# Patient Record
Sex: FEMALE | Race: BLACK OR AFRICAN AMERICAN | NOT HISPANIC OR LATINO | Employment: FULL TIME | URBAN - METROPOLITAN AREA
[De-identification: names, ages, dates, MRNs, and addresses within clinical notes are randomized per-mention and may not be internally consistent; named-entity substitution may affect disease eponyms.]

---

## 2021-12-09 DIAGNOSIS — O30.042 DICHORIONIC DIAMNIOTIC TWIN PREGNANCY IN SECOND TRIMESTER: Primary | ICD-10-CM

## 2021-12-09 DIAGNOSIS — Z36.89 ENCOUNTER FOR FETAL ANATOMIC SURVEY: ICD-10-CM

## 2022-01-04 ENCOUNTER — TELEPHONE (OUTPATIENT)
Dept: MATERNAL FETAL MEDICINE | Facility: CLINIC | Age: 27
End: 2022-01-04

## 2022-01-04 ENCOUNTER — PATIENT MESSAGE (OUTPATIENT)
Dept: MATERNAL FETAL MEDICINE | Facility: CLINIC | Age: 27
End: 2022-01-04

## 2022-01-04 NOTE — TELEPHONE ENCOUNTER
Pt verified self by name, . JOSE LUISCARLYLE.  Pt states she cancel her appt for tomorrow as she is currently experiencing complications at this time. She is currently in [place] for a week now. PT is waiting to hear back from Dr. Fish but pt wants to keep the current high risk doctor she is seeing now in [place]. Pt confirmed to cancel and not reschedule any appts with MFM at this time.   Pt verbalized understanding. Agreed to POC w intent to comply.

## 2024-02-19 ENCOUNTER — TELEPHONE (OUTPATIENT)
Dept: MATERNAL FETAL MEDICINE | Facility: CLINIC | Age: 29
End: 2024-02-19
Payer: COMMERCIAL

## 2024-02-19 DIAGNOSIS — N88.3 INCOMPETENT CERVIX: Primary | ICD-10-CM

## 2024-02-19 DIAGNOSIS — Z36.89 ENCOUNTER FOR FETAL ANATOMIC SURVEY: ICD-10-CM

## 2024-02-19 NOTE — TELEPHONE ENCOUNTER
RN returned phone call to Dr. Fish office at 1:37pm. Nadege Carter states they sent a referral a month ago. RN confirmed  we have not received anything recently. Nadege Friedates she will send chart again and to be called back when we book patient.     RN will call back if we do not receive fax chart on this patient.

## 2024-02-19 NOTE — TELEPHONE ENCOUNTER
----- Message from Val Lino MA sent at 2/19/2024  1:22 PM CST -----  Dr. Deedee Fish's office called in concerns to a referral sent over about a month ago. Pt has incompetent cervix.     Informed staff RN would get back to her. Call back number is [phone].

## 2024-02-26 ENCOUNTER — PROCEDURE VISIT (OUTPATIENT)
Dept: MATERNAL FETAL MEDICINE | Facility: CLINIC | Age: 29
End: 2024-02-26
Payer: COMMERCIAL

## 2024-02-26 ENCOUNTER — OFFICE VISIT (OUTPATIENT)
Dept: MATERNAL FETAL MEDICINE | Facility: CLINIC | Age: 29
End: 2024-02-26
Payer: COMMERCIAL

## 2024-02-26 VITALS
HEIGHT: 64 IN | WEIGHT: 206.13 LBS | SYSTOLIC BLOOD PRESSURE: 134 MMHG | BODY MASS INDEX: 35.19 KG/M2 | DIASTOLIC BLOOD PRESSURE: 80 MMHG

## 2024-02-26 DIAGNOSIS — N88.3 INCOMPETENT CERVIX: ICD-10-CM

## 2024-02-26 DIAGNOSIS — Z36.89 ENCOUNTER FOR FETAL ANATOMIC SURVEY: Primary | ICD-10-CM

## 2024-02-26 DIAGNOSIS — O09.899 HISTORY OF PRETERM DELIVERY, CURRENTLY PREGNANT: ICD-10-CM

## 2024-02-26 DIAGNOSIS — Z36.89 ENCOUNTER FOR FETAL ANATOMIC SURVEY: ICD-10-CM

## 2024-02-26 PROCEDURE — 76815 OB US LIMITED FETUS(S): CPT | Mod: S$GLB,,, | Performed by: STUDENT IN AN ORGANIZED HEALTH CARE EDUCATION/TRAINING PROGRAM

## 2024-02-26 PROCEDURE — 99203 OFFICE O/P NEW LOW 30 MIN: CPT | Mod: S$GLB,,, | Performed by: STUDENT IN AN ORGANIZED HEALTH CARE EDUCATION/TRAINING PROGRAM

## 2024-02-26 PROCEDURE — 76817 TRANSVAGINAL US OBSTETRIC: CPT | Mod: S$GLB,,, | Performed by: STUDENT IN AN ORGANIZED HEALTH CARE EDUCATION/TRAINING PROGRAM

## 2024-03-03 PROBLEM — O09.899 HISTORY OF PRETERM DELIVERY, CURRENTLY PREGNANT: Status: ACTIVE | Noted: 2024-03-03

## 2024-03-04 NOTE — ASSESSMENT & PLAN NOTE
Patient's obstetric history reviewed.  G1- term , uncomplicated  G2- di/di twin pregnancy, at 18 weeks spontaneously delivered twin A (demise), had an emergency cerclage placed, then went into labor at 30wga and delivered. This child is alive and doing well.     We had a long discussion about her pregnancy history and recommendations for this pregnancy. Her pregnancy history is concerning for cervical insufficiency, although twin pregnancy is an independent risk factor for  delivery. Given the patient's history with interval cerclage placement, I did offer/recommend a history-indicated cerclage. Patient declines at this time.      We also briefly discussed other treatments for history of  delivery: 17-alpha hydroxyprogesterone caproate has been removed from the market (most recent studies suggest that it is ineffective for the prevention of recurrent  birth).    The MFM section continues to recommend cervical length screening between 16 - 22 weeks 6 days in ward gestations to identify patients who may benefit from cervical cerclage placement. The benefit of vaginal progesterone for the prevention of  birth is unclear in the absence of cervical shortening. In patients with a prior  birth and a cervical length of 25-30 mm, there is a non-significant trend to reduction in  birth, whereas in those with a cervical length < 25 mm, there appears to consistently be a reduction in the risk of premature birth.      Patient meets criteria for history-indicated cerclage. However, she declines at this time and would like to proceed with cervical length surveillance. I did  her on the risks of previable delivery/cervical shortening/dilation to the point where cerclage placement may not be possible. She expressed understanding and will consider a cerclage if her cervix starts to shorten.     Recommendations from our MFM group at [facility]:   Initiate cervical length  surveillance every 2 weeks, continuing until 22 weeks 6 days gestation  If the cervix measures <30 mm, perform weekly cervical length  Patients with a cervical length of 25 - 30 mm with a prior spontaneous PTB may benefit from vaginal progesterone, but this is less clear. Consider initiation of vaginal progesterone.  Patients with a cervical length < 25 mm may be offered cerclage, vaginal progesterone, or both after counseling with MFM.   Once a patient has a cerclage, no additional cervical length measurement is routinely recommended    Vaginal progesterone--patient declines at this time.

## 2024-03-04 NOTE — PROGRESS NOTES
"MATERNAL-FETAL MEDICINE   CONSULT NOTE    Provider requesting consultation: Deedee Fish MD    SUBJECTIVE:     Ms. Caio George is a 28 y.o.  female with IUP at 15w5d who is seen in consultation by MFM for evaluation and management of:  Problem   History of  Delivery, Currently Pregnant     Patient states she is feeling well. Denies contractions, vaginal bleeding, or leakage of fluid. She has no complaints or concerns today.       Review of patient's allergies indicates:  No Known Allergies    PMH:  Past Medical History:   Diagnosis Date    Incompetent cervix     Obesity, unspecified        PObHx:  OB History    Para Term  AB Living   3 2 1 1 1 2   SAB IAB Ectopic Multiple Live Births         1 2      # Outcome Date GA Lbr Lj/2nd Weight Sex Delivery Anes PTL Lv   3 Current            2A AB  18w0d       FD   2B  22 30w0d  1.616 kg (3 lb 9 oz) M CS-Unspec   PATRICA   1 Term 14 39w0d  3.515 kg (7 lb 12 oz) M Vag-Spont   PATRICA       PSH:  Past Surgical History:   Procedure Laterality Date    CERVICAL CERCLAGE       SECTION         Family history:family history is not on file.    Social history: reports that she has never smoked. She has never used smokeless tobacco. She reports that she does not currently use alcohol. She reports that she does not use drugs.    Genetic history:  The patient denies any inherited genetic diseases or birth defects in herself or her partner's personal history or family.    Objective:   /80   Ht 5' 4" (1.626 m)   Wt 93.5 kg (206 lb 2.1 oz)   BMI 35.38 kg/m²       Ultrasound performed. See viewpoint for full ultrasound report.  1. A ward living IUP is present.   2. Size is consistent with established dating.   3. Limited early anatomy appears unremarkable.   4. Transvaginal scan demonstrates a normal cervical length at 46.8 mm   5. The maternal adnexae are normal in appearance.       ASSESSMENT/PLAN:     28 y.o. "  female with IUP at 15w5d     History of  delivery, currently pregnant  Patient's obstetric history reviewed.  G1- term , uncomplicated  G2- di/di twin pregnancy, at 18 weeks spontaneously delivered twin A (demise), had an emergency cerclage placed, then went into labor at 30wga and delivered. This child is alive and doing well.     We had a long discussion about her pregnancy history and recommendations for this pregnancy. Her pregnancy history is concerning for cervical insufficiency, although twin pregnancy is an independent risk factor for  delivery. Given the patient's history with interval cerclage placement, I did offer/recommend a history-indicated cerclage. Patient declines at this time.      We also briefly discussed other treatments for history of  delivery: 17-alpha hydroxyprogesterone caproate has been removed from the market (most recent studies suggest that it is ineffective for the prevention of recurrent  birth).    The M section continues to recommend cervical length screening between 16 - 22 weeks 6 days in ward gestations to identify patients who may benefit from cervical cerclage placement. The benefit of vaginal progesterone for the prevention of  birth is unclear in the absence of cervical shortening. In patients with a prior  birth and a cervical length of 25-30 mm, there is a non-significant trend to reduction in  birth, whereas in those with a cervical length < 25 mm, there appears to consistently be a reduction in the risk of premature birth.      Patient meets criteria for history-indicated cerclage. However, she declines at this time and would like to proceed with cervical length surveillance. I did  her on the risks of previable delivery/cervical shortening/dilation to the point where cerclage placement may not be possible. She expressed understanding and will consider a cerclage if her cervix starts to shorten.      Recommendations from our MFM group at [facility]:   Initiate cervical length surveillance every 2 weeks, continuing until 22 weeks 6 days gestation  If the cervix measures <30 mm, perform weekly cervical length  Patients with a cervical length of 25 - 30 mm with a prior spontaneous PTB may benefit from vaginal progesterone, but this is less clear. Consider initiation of vaginal progesterone.  Patients with a cervical length < 25 mm may be offered cerclage, vaginal progesterone, or both after counseling with MFM.   Once a patient has a cerclage, no additional cervical length measurement is routinely recommended    Vaginal progesterone--patient declines at this time.      FOLLOW UP:   F/u in 2 weeks for cervical length screening  F/u in 4 weeks for US/MD visit      30 minutes of total time spent on the encounter, which includes face to face time and non-face to face time preparing to see the patient (eg, review of tests), obtaining and/or reviewing separately obtained history, documenting clinical information in the electronic or other health record, independently interpreting results (not separately reported) and communicating results to the patient/family/caregiver, or care coordination (not separately reported).      CHRIS Vincent MD  Maternal-Fetal Medicine

## 2024-03-13 ENCOUNTER — PROCEDURE VISIT (OUTPATIENT)
Dept: MATERNAL FETAL MEDICINE | Facility: CLINIC | Age: 29
End: 2024-03-13
Payer: COMMERCIAL

## 2024-03-13 DIAGNOSIS — Z36.89 ENCOUNTER FOR FETAL ANATOMIC SURVEY: ICD-10-CM

## 2024-03-13 PROCEDURE — 76817 TRANSVAGINAL US OBSTETRIC: CPT | Mod: S$GLB,,, | Performed by: OBSTETRICS & GYNECOLOGY

## 2024-03-27 ENCOUNTER — PROCEDURE VISIT (OUTPATIENT)
Dept: MATERNAL FETAL MEDICINE | Facility: CLINIC | Age: 29
End: 2024-03-27
Payer: COMMERCIAL

## 2024-03-27 ENCOUNTER — OFFICE VISIT (OUTPATIENT)
Dept: MATERNAL FETAL MEDICINE | Facility: CLINIC | Age: 29
End: 2024-03-27
Payer: COMMERCIAL

## 2024-03-27 VITALS
BODY MASS INDEX: 36.31 KG/M2 | SYSTOLIC BLOOD PRESSURE: 125 MMHG | WEIGHT: 211.56 LBS | DIASTOLIC BLOOD PRESSURE: 77 MMHG

## 2024-03-27 DIAGNOSIS — Z36.89 ENCOUNTER FOR ULTRASOUND TO ASSESS FETAL GROWTH: Primary | ICD-10-CM

## 2024-03-27 DIAGNOSIS — Z36.89 ENCOUNTER FOR FETAL ANATOMIC SURVEY: ICD-10-CM

## 2024-03-27 PROCEDURE — 76817 TRANSVAGINAL US OBSTETRIC: CPT | Mod: S$GLB,,, | Performed by: OBSTETRICS & GYNECOLOGY

## 2024-03-27 PROCEDURE — 76811 OB US DETAILED SNGL FETUS: CPT | Mod: S$GLB,,, | Performed by: OBSTETRICS & GYNECOLOGY

## 2024-03-27 PROCEDURE — 99214 OFFICE O/P EST MOD 30 MIN: CPT | Mod: S$GLB,,, | Performed by: OBSTETRICS & GYNECOLOGY

## 2024-03-27 NOTE — PROGRESS NOTES
Maternal Fetal Medicine follow up consult    SUBJECTIVE:     Caio George is a 29 y.o.  female with IUP at 20w0d who is seen in follow up consultation by [facility].  Pregnancy complications include:   No problems updated.    Previous notes reviewed.   No changes to medical, surgical, family, social, or obstetric history.    Interval history since last [facility] visit: No complaints.  No contractions bleeding, LOF    Medications reviewed.    Care team members:  Dr. Fish - Primary OB       OBJECTIVE:   /77   Wt 95.9 kg (211 lb 8.5 oz)   BMI 36.31 kg/m²         Ultrasound performed. See viewpoint for full ultrasound report.  A detailed fetal anatomic ultrasound examination was performed for the following high risk indication: obesity.   No fetal structural malformations are identified; however, fetal imaging is incomplete today.   A follow-up study will be scheduled to complete the fetal anatomic survey.   Fetal size today is consistent with established gestational age.   Cervical length by TA scanning is normal.   Placental location is anterior without evidence of previa. An area of hemorrhage was seen in the subchorionic region of the placenta.   The maternal adnexae are normal in appearance. The amount of free fluid seen in the pelvis is within normal physiologic range.    The cervix has normal length with no evidence of funneling        ASSESSMENT/PLAN:     29 y.o.  female with IUP at 20w0d  Continue cervical surveillance.  F/U anatomy 6 weeks    The patient was given an opportunity to ask questions about management and the diease process.  She expressed an understanding of and agreement to the above impression and plan. All questions were answered to her satisfaction.  She was given contact information to the [facility] clinic to address further concerns.

## 2024-04-09 ENCOUNTER — PROCEDURE VISIT (OUTPATIENT)
Dept: MATERNAL FETAL MEDICINE | Facility: CLINIC | Age: 29
End: 2024-04-09
Payer: COMMERCIAL

## 2024-04-09 DIAGNOSIS — Z36.89 ENCOUNTER FOR FETAL ANATOMIC SURVEY: ICD-10-CM

## 2024-04-09 PROCEDURE — 76817 TRANSVAGINAL US OBSTETRIC: CPT | Mod: S$GLB,,, | Performed by: OBSTETRICS & GYNECOLOGY

## 2024-05-13 ENCOUNTER — PROCEDURE VISIT (OUTPATIENT)
Dept: MATERNAL FETAL MEDICINE | Facility: CLINIC | Age: 29
End: 2024-05-13
Payer: COMMERCIAL

## 2024-05-13 DIAGNOSIS — Z36.89 ENCOUNTER FOR ULTRASOUND TO ASSESS FETAL GROWTH: ICD-10-CM

## 2024-05-13 PROCEDURE — 76816 OB US FOLLOW-UP PER FETUS: CPT | Mod: S$GLB,,, | Performed by: STUDENT IN AN ORGANIZED HEALTH CARE EDUCATION/TRAINING PROGRAM
